# Patient Record
Sex: FEMALE | URBAN - NONMETROPOLITAN AREA
[De-identification: names, ages, dates, MRNs, and addresses within clinical notes are randomized per-mention and may not be internally consistent; named-entity substitution may affect disease eponyms.]

---

## 2023-10-18 ENCOUNTER — OFFICE VISIT (OUTPATIENT)
Dept: URBAN - NONMETROPOLITAN AREA CLINIC 4 | Facility: CLINIC | Age: 53
End: 2023-10-18

## 2023-10-23 ENCOUNTER — LAB OUTSIDE AN ENCOUNTER (OUTPATIENT)
Dept: URBAN - NONMETROPOLITAN AREA CLINIC 4 | Facility: CLINIC | Age: 53
End: 2023-10-23

## 2023-10-23 ENCOUNTER — OFFICE VISIT (OUTPATIENT)
Dept: URBAN - NONMETROPOLITAN AREA CLINIC 4 | Facility: CLINIC | Age: 53
End: 2023-10-23

## 2023-10-23 ENCOUNTER — DASHBOARD ENCOUNTERS (OUTPATIENT)
Age: 53
End: 2023-10-23

## 2023-10-23 VITALS
SYSTOLIC BLOOD PRESSURE: 93 MMHG | BODY MASS INDEX: 20.5 KG/M2 | WEIGHT: 130.6 LBS | HEART RATE: 64 BPM | HEIGHT: 67 IN | TEMPERATURE: 97.9 F | DIASTOLIC BLOOD PRESSURE: 71 MMHG

## 2023-10-23 DIAGNOSIS — R13.19 ESOPHAGEAL DYSPHAGIA: ICD-10-CM

## 2023-10-23 DIAGNOSIS — Z12.11 ENCOUNTER FOR SCREENING COLONOSCOPY: ICD-10-CM

## 2023-10-23 DIAGNOSIS — R63.4 UNINTENTIONAL WEIGHT LOSS OF 10% BODY WEIGHT WITHIN 6 MONTHS: ICD-10-CM

## 2023-10-23 PROBLEM — 305058001: Status: ACTIVE | Noted: 2023-10-23

## 2023-10-23 PROBLEM — 448765001: Status: ACTIVE | Noted: 2023-10-23

## 2023-10-23 PROBLEM — 40890009: Status: ACTIVE | Noted: 2023-10-23

## 2023-10-23 PROBLEM — 34923007: Status: ACTIVE | Noted: 2023-10-23

## 2023-10-23 PROBLEM — 371596008: Status: ACTIVE | Noted: 2023-10-23

## 2023-10-23 PROCEDURE — 99205 OFFICE O/P NEW HI 60 MIN: CPT | Performed by: PHYSICIAN ASSISTANT

## 2023-10-23 RX ORDER — SUCRALFATE 1 G/10ML
10 ML 1 HOUR BEFORE MEALS AND AT BEDTIME ON AN EMPTY STOMACH SUSPENSION ORAL
Qty: 1200 | OUTPATIENT
Start: 2023-10-23 | End: 2023-11-21

## 2023-10-23 RX ORDER — LITHIUM CARBONATE 300 MG/1
TAKE ONE CAPSULE BY MOUTH TWICE A DAY CAPSULE, GELATIN COATED ORAL
Qty: 60 UNSPECIFIED | Refills: 2 | Status: ACTIVE | COMMUNITY

## 2023-10-23 RX ORDER — LEVOTHYROXINE SODIUM 100 UG/1
TABLET ORAL
Qty: 90 TABLET | Status: ACTIVE | COMMUNITY

## 2023-10-23 RX ORDER — FAMOTIDINE 40 MG/1
1 TABLET AT BEDTIME TABLET, FILM COATED ORAL ONCE A DAY
Qty: 30 | OUTPATIENT
Start: 2023-10-23

## 2023-10-23 RX ORDER — CLONAZEPAM 0.5 MG/1
TAKE ONE TABLET BY MOUTH THREE TIMES A DAY TABLET ORAL
Qty: 90 UNSPECIFIED | Refills: 0 | Status: ACTIVE | COMMUNITY

## 2023-10-23 RX ORDER — SODIUM, POTASSIUM,MAG SULFATES 17.5-3.13G
177ML SOLUTION, RECONSTITUTED, ORAL ORAL ONCE
Qty: 1 | Refills: 0 | OUTPATIENT
Start: 2023-10-23 | End: 2023-10-24

## 2023-10-23 NOTE — HPI-TODAY'S VISIT:
Maegan Nichols is a 52 year old female with past medical history of tobacco abuse as well as bipolar disorder end anxiety as well as hypothyroidism who presented to the office today secondary 2 persistent complaints of difficulty swallowing, acid reflux and unintentional weight loss which has been ongoing for nearly two years  she states she was previously referred to us some time ago, but was unable to follow up. She recently had a partial hysterectomy in August of this year and states now that she is available she would like to proceed with our evaluation.  She has never had a screening colonoscopy, she denies any family history of colon cancer.  In regards to her upper GI symptoms, she states she smokes about 1/2 a pack per day when previously was smoking a pack per day and over the last year has lost approximately 20 pounds unintentionally. She feels that liquids pass with no problem however with eating even soft food she feels the sluggish movement of the food throughout her chest and stomach postprandially  she also admits to self induced vomiting which she states that she's been doing everyday for several years and feels that it is related to her bipolar disorder, she lives with nausea everyday  No melena, no hematemesis.  Does not have food feel stuck, just slow.

## 2023-10-23 NOTE — PHYSICAL EXAM CONSTITUTIONAL:
normal,  alert,  pleasant, well nourished, in no acute distress,  well developed, well nourished, but thin,  ambulating without difficulty

## 2023-10-30 ENCOUNTER — TELEPHONE ENCOUNTER (OUTPATIENT)
Dept: URBAN - METROPOLITAN AREA CLINIC 54 | Facility: CLINIC | Age: 53
End: 2023-10-30

## 2023-10-30 RX ORDER — SUCRALFATE 1 G/1
10 ML 1 HOUR BEFORE MEALS AND AT BEDTIME ON AN EMPTY STOMACH TABLET ORAL
Qty: 1200 | Refills: 0
Start: 2023-10-23 | End: 2023-11-28

## 2023-11-14 ENCOUNTER — OFFICE VISIT (OUTPATIENT)
Dept: URBAN - METROPOLITAN AREA SURGERY CENTER 14 | Facility: SURGERY CENTER | Age: 53
End: 2023-11-14

## 2023-11-29 ENCOUNTER — ERX REFILL RESPONSE (OUTPATIENT)
Dept: URBAN - NONMETROPOLITAN AREA CLINIC 4 | Facility: CLINIC | Age: 53
End: 2023-11-29

## 2023-11-29 RX ORDER — FAMOTIDINE 40 MG/1
1 TABLET AT BEDTIME TABLET, FILM COATED ORAL ONCE A DAY
Qty: 30 | OUTPATIENT

## 2023-11-29 RX ORDER — FAMOTIDINE 40 MG/1
TAKE ONE TABLET BY MOUTH AT BEDTIME TABLET, FILM COATED ORAL
Qty: 30 TABLET | Refills: 0 | OUTPATIENT

## 2024-01-02 ENCOUNTER — OFFICE VISIT (OUTPATIENT)
Dept: URBAN - NONMETROPOLITAN AREA CLINIC 4 | Facility: CLINIC | Age: 54
End: 2024-01-02

## 2024-01-02 RX ORDER — LEVOTHYROXINE SODIUM 100 UG/1
TABLET ORAL
Qty: 90 TABLET | COMMUNITY

## 2024-01-02 RX ORDER — FAMOTIDINE 40 MG/1
TAKE ONE TABLET BY MOUTH AT BEDTIME TABLET, FILM COATED ORAL
Qty: 30 TABLET | Refills: 0 | COMMUNITY

## 2024-01-02 RX ORDER — LITHIUM CARBONATE 300 MG/1
TAKE ONE CAPSULE BY MOUTH TWICE A DAY CAPSULE, GELATIN COATED ORAL
Qty: 60 UNSPECIFIED | Refills: 2 | COMMUNITY

## 2024-01-02 RX ORDER — CLONAZEPAM 0.5 MG/1
TAKE ONE TABLET BY MOUTH THREE TIMES A DAY TABLET ORAL
Qty: 90 UNSPECIFIED | Refills: 0 | COMMUNITY